# Patient Record
Sex: MALE | Race: WHITE
[De-identification: names, ages, dates, MRNs, and addresses within clinical notes are randomized per-mention and may not be internally consistent; named-entity substitution may affect disease eponyms.]

---

## 2019-02-21 NOTE — NUR
INCONTIENT OF LIQUID STOOL. ASSISTED TO BR. LINENS AND GOWN CHANGED. RETURNED
TO BED SAT RA 93%. DENIES DISCOMFORT.

## 2019-02-21 NOTE — NUR
02/21/19 0501 Sheets,Fatou
0453 PT ARRIVED TO PACU ON 6L VIA MASK, PT REACTIVE TO TACTILES
STIMULI. PT REORINTED TO PACU.
 
7769 PT DENIES NAUSEA AND PAIN. PT COUGHING OFF AND ON. O2 MASK
REMOVED.

## 2021-04-03 ENCOUNTER — HOSPITAL ENCOUNTER (EMERGENCY)
Dept: HOSPITAL 46 - ED | Age: 66
Discharge: HOME | End: 2021-04-03
Payer: COMMERCIAL

## 2021-04-03 VITALS — BODY MASS INDEX: 21.92 KG/M2 | WEIGHT: 180 LBS | HEIGHT: 76 IN

## 2021-04-03 DIAGNOSIS — Z87.442: ICD-10-CM

## 2021-04-03 DIAGNOSIS — N13.2: Primary | ICD-10-CM

## 2021-04-05 ENCOUNTER — HOSPITAL ENCOUNTER (EMERGENCY)
Dept: HOSPITAL 46 - ED | Age: 66
Discharge: HOME | End: 2021-04-05
Payer: COMMERCIAL

## 2021-04-05 VITALS — WEIGHT: 191.8 LBS | HEIGHT: 76 IN | BODY MASS INDEX: 23.36 KG/M2

## 2021-04-05 DIAGNOSIS — F17.200: ICD-10-CM

## 2021-04-05 DIAGNOSIS — N20.1: Primary | ICD-10-CM

## 2021-04-05 DIAGNOSIS — Z87.442: ICD-10-CM

## 2021-04-05 NOTE — XMS
PreManage Notification: ORLANDO LEVY MRN:[id number]
 
Security Information
 
Security Events
No recent Security Events currently on file
 
 
 
CRITERIA MET
------------
- [facility] - 2 Visits in 30 Days
 
 
CARE PROVIDERS
There are no care providers on record at this time.
 
Jono has no Care Guidelines for this patient.
 
[facility] VIS[facility] COUNT (12 MO.)
-------------------------------------------------------------------------------------
[facility]-----
TOTAL 2
-------------------------------------------------------------------------------------
NOTE: Visits indicate total known visits.
 
ED/C VISIT TRACKING (12 MO.)
-------------------------------------------------------------------------------------
[facility]2021 18[facility]
 
[facility]
 
COMPLAINT:
- ABD PAIN
-------------------------------------------------------------------------------------
04/03/2021 07:06
[facility] [place]
 
TYPE: Emergency
 
COMPLAINT:
- LT SIDE KIDNEY/ABD PAIN
-------------------------------------------------------------------------------------
 
 
INPATIENT VISIT TRACKING (12 MO.)
No inpatient visits to display in this time frame
 
[email or web address]

## 2022-12-18 ENCOUNTER — HOSPITAL ENCOUNTER (EMERGENCY)
Dept: HOSPITAL 46 - ED | Age: 67
Discharge: HOME | End: 2022-12-18
Payer: MEDICARE

## 2022-12-18 VITALS — BODY MASS INDEX: 23.23 KG/M2 | HEIGHT: 76 IN | WEIGHT: 190.74 LBS

## 2022-12-18 DIAGNOSIS — Z87.442: ICD-10-CM

## 2022-12-18 DIAGNOSIS — R55: Primary | ICD-10-CM

## 2022-12-18 DIAGNOSIS — Z79.899: ICD-10-CM

## 2022-12-18 DIAGNOSIS — F17.200: ICD-10-CM

## 2022-12-18 DIAGNOSIS — K21.9: ICD-10-CM

## 2022-12-19 NOTE — EKG
[facility]
                                    [address]
                                  [place]  [address]
_________________________________________________________________________________________
                                                                 Signed   
 
 
Normal sinus rhythm
Normal ECG
No previous ECGs available
Confirmed by Milagro De Leon MD ([id number]) on 12/19/2022 9:54:57 PM
 
 
 
 
 
 
 
 
 
 
 
 
 
 
 
 
 
 
 
 
 
 
 
 
 
 
 
 
 
 
 
 
 
 
 
 
 
 
 
 
 
    Electronically Signed By: MILAGRO DE LEON MD  12/19/22 2155
_________________________________________________________________________________________
PATIENT NAME:     ORLANDO LEVY                   
MEDICAL RECORD #: [id number]                     Electrocardiogram             
          ACCT #: [id number]  
DATE OF BIRTH:   [birth year]                                       
PHYSICIAN:   MILAGRO DE LEON MD                 REPORT #: [id number]
REPORT IS CONFIDENTIAL AND NOT TO BE RELEASED WITHOUT AUTHORIZATION